# Patient Record
Sex: MALE | URBAN - NONMETROPOLITAN AREA
[De-identification: names, ages, dates, MRNs, and addresses within clinical notes are randomized per-mention and may not be internally consistent; named-entity substitution may affect disease eponyms.]

---

## 2021-09-28 ENCOUNTER — APPOINTMENT (RX ONLY)
Dept: URBAN - NONMETROPOLITAN AREA CLINIC 13 | Facility: CLINIC | Age: 36
Setting detail: DERMATOLOGY
End: 2021-09-28

## 2021-09-28 DIAGNOSIS — Z41.9 ENCOUNTER FOR PROCEDURE FOR PURPOSES OTHER THAN REMEDYING HEALTH STATE, UNSPECIFIED: ICD-10-CM

## 2021-09-28 PROCEDURE — ? COSMETIC CONSULTATION: Q SWITCHED LASER

## 2021-09-28 PROCEDURE — ? MEDICAL CONSULTATION: PULSED-DYE LASER

## 2021-09-28 PROCEDURE — ? COSMETIC CONSULTATION: BOTULINUM TOXIN

## 2021-10-06 ENCOUNTER — APPOINTMENT (RX ONLY)
Dept: URBAN - NONMETROPOLITAN AREA CLINIC 13 | Facility: CLINIC | Age: 36
Setting detail: DERMATOLOGY
End: 2021-10-06

## 2021-10-06 DIAGNOSIS — Z41.9 ENCOUNTER FOR PROCEDURE FOR PURPOSES OTHER THAN REMEDYING HEALTH STATE, UNSPECIFIED: ICD-10-CM

## 2021-10-06 DIAGNOSIS — L81.4 OTHER MELANIN HYPERPIGMENTATION: ICD-10-CM

## 2021-10-06 PROCEDURE — ? Q-SWITCHED LASER

## 2021-10-06 PROCEDURE — ? PULSED-DYE LASER

## 2021-10-06 ASSESSMENT — LOCATION DETAILED DESCRIPTION DERM
LOCATION DETAILED: LEFT SUPERIOR CENTRAL MALAR CHEEK
LOCATION DETAILED: RIGHT SUPERIOR CENTRAL MALAR CHEEK

## 2021-10-06 ASSESSMENT — LOCATION SIMPLE DESCRIPTION DERM
LOCATION SIMPLE: RIGHT CHEEK
LOCATION SIMPLE: LEFT CHEEK

## 2021-10-06 ASSESSMENT — LOCATION ZONE DERM: LOCATION ZONE: FACE

## 2021-10-06 NOTE — PROCEDURE: Q-SWITCHED LASER
Spot Size In Mm: 2
Consent: Written consent obtained, risks reviewed including but not limited to crusting, scabbing, blistering, scarring, darker or lighter pigmentary change, systemic reactions, ulceration, incidental hair removal, bruising, and/or incomplete removal.
Detail Level: Detailed
Frequency In Hz: 5
Spot Size In Mm: 8
Treatment Number: 1
Area In Cm^2: 0
Fluence: 7.5
Post-Care Instructions: I reviewed with the patient in detail post-care instructions. Patient should avoid sunlight and wear sun protection.
Fluence: 4.5
Number Of Pulses: 2 passes
Anesthesia Type: 1% lidocaine with epinephrine
Frequency In Hz: 10
Price (Use Numbers Only, No Special Characters Or $): 100
Number Of Pulses: x4
Spot Size In Mm: 3
Fluence: 0.6
Endpoint: Immediate endpoint erythema. Post care reviewed with patient. Tanya has scars in the hairline from face lift. She had them tattooed to match her skin but they look more pink/orange. Tested with 1064 one pass.
Fluence: 1.8

## 2021-10-06 NOTE — PROCEDURE: PULSED-DYE LASER
Cryogen Time (Ms): 30
Cryogen Time (Ms): 0
Price (Use Numbers Only, No Special Characters Or $): 100
Delay Time (Ms): 20
Location Override: one angioma
Detail Level: Zone
Treated Area: small area
Post-Care Instructions: I reviewed with the patient in detail post-care instructions. Patient should stay away from the sun and wear sun protection until treated areas are fully healed.
Pulse Duration: 10 ms
Fluence In J/Cm2 (Optional): 8
Spot Size: 7 mm
Immediate Endpoint: erythema
Spot Size: 10 mm
Spot Size: 5 mm
Post-Procedure Care: Post care reviewed with patient.
Fluence In J/Cm2 (Optional): 7.5
Laser Type: Vbeam 595nm
Pulse Duration: 1.5 ms
Consent: Written consent obtained, risks reviewed including but not limited to crusting, scabbing, blistering, scarring, darker or lighter pigmentary change, incidental hair removal, bruising, and/or incomplete removal.

## 2022-02-16 ENCOUNTER — APPOINTMENT (RX ONLY)
Dept: URBAN - NONMETROPOLITAN AREA CLINIC 13 | Facility: CLINIC | Age: 37
Setting detail: DERMATOLOGY
End: 2022-02-16

## 2022-02-16 DIAGNOSIS — Z41.9 ENCOUNTER FOR PROCEDURE FOR PURPOSES OTHER THAN REMEDYING HEALTH STATE, UNSPECIFIED: ICD-10-CM

## 2022-02-16 PROCEDURE — ? PULSED-DYE LASER

## 2022-02-16 PROCEDURE — ? BOTOX

## 2022-02-16 NOTE — PROCEDURE: PULSED-DYE LASER
Fluence In J/Cm2 (Optional): 8
Laser Type: Vbeam 595nm
Consent: Written consent obtained, risks reviewed including but not limited to crusting, scabbing, blistering, scarring, darker or lighter pigmentary change, incidental hair removal, bruising, and/or incomplete removal.
Spot Size: 7 mm
Immediate Endpoint: erythema
Spot Size: 5 mm
Post-Procedure Care: Post care reviewed with patient.
Pulse Duration: 1.5 ms
Pulse Duration: 10 ms
Comments: Repeat in one month at no cost if needed.
Cryogen Time (Ms): 30
Treated Area: medium area
Post-Care Instructions: I reviewed with the patient in detail post-care instructions. Patient should stay away from the sun and wear sun protection until treated areas are fully healed.
Delay Time (Ms): 20
Pulse Count: x3
Location Override: one angioma on R zacarias
Cryogen Time (Ms): 0
Price (Use Numbers Only, No Special Characters Or $): 50
Detail Level: Zone
Spot Size: 10 mm
Fluence In J/Cm2 (Optional): 7
Immediate Endpoint: purpura
Fluence In J/Cm2 (Optional): 9

## 2022-02-16 NOTE — PROCEDURE: BOTOX
R Brow Units: 0
Show Lcl Units: No
Show Additional Area 4: Yes
Additional Area 6 Location: chin
Consent: Written consent obtained. Risks include but not limited to lid/brow ptosis, bruising, swelling, diplopia, temporary effect, incomplete chemical denervation.
Additional Area 1 Location: brow
Detail Level: Zone
Additional Area 5 Location: forhead R side 1 unit.
Post-Care Instructions: Patient instructed to not lie down for 4 hours and limit physical activity for 24 hours. Patient instructed not to travel by airplane for 48 hours.
Glabellar Complex Units: 25
Additional Area 3 Location: left lateral eye 3 Right 4
Expiration Date (Month Year): 4/24
Additional Area 2 Location: upper lip
Lot #: [id number]
Additional Area 4 Location: corners of nose for gummy smile
Forehead Units: 5
Dilution (U/0.1 Cc): 1
Price (Use Numbers Only, No Special Characters Or $): 580

## 2022-03-03 ENCOUNTER — APPOINTMENT (RX ONLY)
Dept: URBAN - NONMETROPOLITAN AREA CLINIC 13 | Facility: CLINIC | Age: 37
Setting detail: DERMATOLOGY
End: 2022-03-03

## 2022-03-03 DIAGNOSIS — Z41.9 ENCOUNTER FOR PROCEDURE FOR PURPOSES OTHER THAN REMEDYING HEALTH STATE, UNSPECIFIED: ICD-10-CM

## 2022-03-03 PROCEDURE — ? ADDITIONAL NOTES

## 2022-03-03 NOTE — PROCEDURE: ADDITIONAL NOTES
Detail Level: Simple
Additional Notes: One milia removed on chest, no charge.
Render Risk Assessment In Note?: no

## 2022-09-27 ENCOUNTER — APPOINTMENT (RX ONLY)
Dept: URBAN - NONMETROPOLITAN AREA CLINIC 13 | Facility: CLINIC | Age: 37
Setting detail: DERMATOLOGY
End: 2022-09-27

## 2022-09-27 DIAGNOSIS — Z41.9 ENCOUNTER FOR PROCEDURE FOR PURPOSES OTHER THAN REMEDYING HEALTH STATE, UNSPECIFIED: ICD-10-CM

## 2022-09-27 PROCEDURE — ? BOTOX

## 2022-09-27 NOTE — PROCEDURE: BOTOX
Left Periorbital Skin Units: 0
Show Right And Left Brow Units: No
Show Additional Area 2: Yes
Consent: Written consent obtained. Risks include but not limited to lid/brow ptosis, bruising, swelling, diplopia, temporary effect, incomplete chemical denervation.
Forehead Units: 5
Dilution (U/0.1 Cc): 1
Additional Area 1 Location: brow
Glabellar Complex Units: 25
Additional Area 4 Location: under lower lash lines R
Price (Use Numbers Only, No Special Characters Or $): 190
Lot #: 
Detail Level: Zone
Additional Area 2 Location: lip
Expiration Date (Month Year): 11/24
Additional Area 3 Location: chin
Additional Area 6 Location: jelly rolls
Post-Care Instructions: Patient instructed to not lie down for 4 hours and limit physical activity for 24 hours. Patient instructed not to travel by airplane for 48 hours.
Additional Area 5 Location: corner of nose/upper lip area

## 2023-02-15 ENCOUNTER — APPOINTMENT (RX ONLY)
Dept: URBAN - NONMETROPOLITAN AREA CLINIC 13 | Facility: CLINIC | Age: 38
Setting detail: DERMATOLOGY
End: 2023-02-15

## 2023-02-15 DIAGNOSIS — Z41.9 ENCOUNTER FOR PROCEDURE FOR PURPOSES OTHER THAN REMEDYING HEALTH STATE, UNSPECIFIED: ICD-10-CM

## 2023-02-15 PROCEDURE — ? BOTOX

## 2023-02-15 NOTE — PROCEDURE: BOTOX
L Brow Units: 0
Post-Care Instructions: Patient instructed to not lie down for 4 hours and limit physical activity for 24 hours. Patient instructed not to travel by airplane for 48 hours.
Show Additional Area 5: Yes
Additional Area 6 Location: jelly rolls
Additional Area 4 Location: under lower lash lines
Detail Level: Zone
Additional Area 5 Location: [facility] [email or web address]
Additional Area 3 Location: chin
Show Mentalis Units: No
Incrementing Botox Units: By 0.5 Units
Expiration Date (Month Year): 4/25
Price (Use Numbers Only, No Special Characters Or $): 390
Additional Area 2 Location: lip
Forehead Units: 5
Lot #: 
Glabellar Complex Units: 25
Consent: Written consent obtained. Risks include but not limited to lid/brow ptosis, bruising, swelling, diplopia, temporary effect, incomplete chemical denervation.
Additional Area 1 Location: brow
Dilution (U/0.1 Cc): 1

## 2023-05-30 ENCOUNTER — APPOINTMENT (RX ONLY)
Dept: URBAN - NONMETROPOLITAN AREA CLINIC 13 | Facility: CLINIC | Age: 38
Setting detail: DERMATOLOGY
End: 2023-05-30

## 2023-05-30 DIAGNOSIS — Z41.9 ENCOUNTER FOR PROCEDURE FOR PURPOSES OTHER THAN REMEDYING HEALTH STATE, UNSPECIFIED: ICD-10-CM

## 2023-05-30 PROCEDURE — ? BOTOX

## 2023-05-30 NOTE — PROCEDURE: BOTOX
Lateral Platysmal Bands Units: 0
Show Lateral Platysmal Band Units: Yes
Consent: Written consent obtained. Risks include but not limited to lid/brow ptosis, bruising, swelling, diplopia, temporary effect, incomplete chemical denervation.
Additional Area 2 Location: lip upper
Show Right And Left Brow Units: No
Incrementing Botox Units: By 0.5 Units
Lot #: 
Post-Care Instructions: Patient instructed to not lie down for 4 hours and limit physical activity for 24 hours. Patient instructed not to travel by airplane for 48 hours.
Additional Area 1 Location: brow
Dilution (U/0.1 Cc): 1
Detail Level: Zone
Forehead Units: 5
Expiration Date (Month Year): 12/25
Glabellar Complex Units: 25
Additional Area 4 Location: under lower lash lines
Additional Area 5 Location: [facility]
Price (Use Numbers Only, No Special Characters Or $): 390
Additional Area 3 Location: chin

## 2023-09-13 ENCOUNTER — APPOINTMENT (RX ONLY)
Dept: URBAN - NONMETROPOLITAN AREA CLINIC 13 | Facility: CLINIC | Age: 38
Setting detail: DERMATOLOGY
End: 2023-09-13

## 2023-09-13 DIAGNOSIS — Z41.9 ENCOUNTER FOR PROCEDURE FOR PURPOSES OTHER THAN REMEDYING HEALTH STATE, UNSPECIFIED: ICD-10-CM

## 2023-09-13 PROCEDURE — ? BOTOX

## 2023-09-13 PROCEDURE — ? PULSED-DYE LASER

## 2023-09-13 PROCEDURE — ? ADDITIONAL NOTES

## 2023-09-13 PROCEDURE — ? COSMETIC CONSULTATION: BOTULINUM TOXIN

## 2023-09-13 PROCEDURE — ? MEDICAL CONSULTATION: PULSED-DYE LASER

## 2023-09-13 NOTE — PROCEDURE: BOTOX
Additional Area 4 Units: 0
Show Lateral Platysmal Band Units: Yes
Additional Area 2 Location: lip upper
Additional Area 6 Location: lateral brows
Show Right And Left Periorbital Units: No
Incrementing Botox Units: By 0.5 Units
Expiration Date (Month Year): 12/25
Additional Area 1 Location: lateral  brows
Detail Level: Zone
Additional Area 5 Location: [facility]
Dilution (U/0.1 Cc): 1
Forehead Units: 5
Price (Use Numbers Only, No Special Characters Or $): 390
Consent: Written consent obtained. Risks include but not limited to lid/brow ptosis, bruising, swelling, diplopia, temporary effect, incomplete chemical denervation.
Additional Area 4 Location: under lower lash lines L eye
Glabellar Complex Units: 25
Lot #: 
Post-Care Instructions: Patient instructed to not lie down for 4 hours and limit physical activity for 24 hours. Patient instructed not to travel by airplane for 48 hours.
Additional Area 3 Location: chin

## 2023-09-13 NOTE — PROCEDURE: ADDITIONAL NOTES
Detail Level: Simple
Additional Notes: Dr. Priest was consulted and he agrees to treatment plan as per written protocol.
Render Risk Assessment In Note?: no

## 2023-09-13 NOTE — PROCEDURE: PULSED-DYE LASER
Post-Procedure Care: Post care reviewed with patient.
Pulse Duration: 10 ms
Laser Type: Vbeam 595nm
Fluence In J/Cm2 (Optional): 7.5
Cryogen Time (Ms): 30
Delay Time (Ms): 20
Pulse Count: x1
Location Override: 4 very small cherry angiomason his arms
Price (Use Numbers Only, No Special Characters Or $): 105
Detail Level: Simple
Consent: Written consent obtained, risks reviewed including but not limited to crusting, scabbing, blistering, scarring, darker or lighter pigmentary change, incidental hair removal, bruising, and/or incomplete removal.
Spot Size: 5 mm
Fluence In J/Cm2 (Optional): 9
Treated Area: medium area
Spot Size: 10 mm
Fluence In J/Cm2 (Optional): 10
Immediate Endpoint: erythema
Post-Care Instructions: I reviewed with the patient in detail post-care instructions. Patient should stay away from the sun and wear sun protection until treated areas are fully healed.\\nIce pack given, instructed to ice for 20 min.
Immediate Endpoint: purpura
Pulse Duration: 1.5 ms
Spot Size: 7 mm

## 2023-11-28 ENCOUNTER — APPOINTMENT (RX ONLY)
Dept: URBAN - NONMETROPOLITAN AREA CLINIC 13 | Facility: CLINIC | Age: 38
Setting detail: DERMATOLOGY
End: 2023-11-28

## 2023-11-28 DIAGNOSIS — Z41.9 ENCOUNTER FOR PROCEDURE FOR PURPOSES OTHER THAN REMEDYING HEALTH STATE, UNSPECIFIED: ICD-10-CM

## 2023-11-28 PROCEDURE — ? BOTOX

## 2023-11-28 NOTE — PROCEDURE: BOTOX
Show Ucl Units: No
[facility] Units: 0
Incrementing Botox Units: By 0.5 Units
Additional Area 2 Location: lip upper
Price (Use Numbers Only, No Special Characters Or $): 390
Show Additional Area 5: Yes
Additional Area 6 Location: lateral brow 3u L. 2uR
Dilution (U/0.1 Cc): 1
Additional Area 3 Location: chin
Expiration Date (Month Year): 4/26
Lot #: 
Glabellar Complex Units: 25
Additional Area 4 Location: under lower lash line
Forehead Units: 5
Post-Care Instructions: Patient instructed to not lie down for 4 hours and limit physical activity for 24 hours. Patient instructed not to travel by airplane for 48 hours.
Detail Level: Zone
Additional Area 1 Location: lateral  brow 5 U L, 3 U R
Additional Area 5 Location: [facility] 3u L , 2u R
Consent: Written consent obtained. Risks include but not limited to lid/brow ptosis, bruising, swelling, diplopia, temporary effect, incomplete chemical denervation.

## 2024-02-27 ENCOUNTER — APPOINTMENT (RX ONLY)
Dept: URBAN - NONMETROPOLITAN AREA CLINIC 13 | Facility: CLINIC | Age: 39
Setting detail: DERMATOLOGY
End: 2024-02-27

## 2024-02-27 DIAGNOSIS — Z41.9 ENCOUNTER FOR PROCEDURE FOR PURPOSES OTHER THAN REMEDYING HEALTH STATE, UNSPECIFIED: ICD-10-CM

## 2024-02-27 PROCEDURE — ? BOTOX

## 2024-02-27 NOTE — PROCEDURE: BOTOX
Left Pupillary Line Units: 0
Price (Use Numbers Only, No Special Characters Or $): 390
Consent: Written consent obtained. Risks include but not limited to lid/brow ptosis, bruising, swelling, diplopia, temporary effect, incomplete chemical denervation.
Show Depressor Anguli Units: Yes
Additional Area 6 Location: brow
Additional Area 2 Location: lip upper
Dilution (U/0.1 Cc): 1
Show Right And Left Brow Units: No
Incrementing Botox Units: By 0.5 Units
Additional Area 1 Location: lateral  brows
Post-Care Instructions: Patient instructed to not lie down for 4 hours and limit physical activity for 24 hours. Patient instructed not to travel by airplane for 48 hours.
Additional Area 5 Location: lateral brows
Detail Level: Zone
Forehead Units: 5
Additional Area 4 Location: under lower lash line
Glabellar Complex Units: 25
Expiration Date (Month Year): 4/26
Additional Area 3 Location: chin
Lot #: